# Patient Record
Sex: MALE | Race: WHITE | HISPANIC OR LATINO | URBAN - METROPOLITAN AREA
[De-identification: names, ages, dates, MRNs, and addresses within clinical notes are randomized per-mention and may not be internally consistent; named-entity substitution may affect disease eponyms.]

---

## 2022-05-03 ENCOUNTER — OFFICE (OUTPATIENT)
Dept: URBAN - METROPOLITAN AREA CLINIC 79 | Facility: CLINIC | Age: 67
End: 2022-05-03

## 2022-05-03 VITALS
WEIGHT: 231 LBS | DIASTOLIC BLOOD PRESSURE: 101 MMHG | HEART RATE: 85 BPM | HEIGHT: 72 IN | SYSTOLIC BLOOD PRESSURE: 147 MMHG

## 2022-05-03 DIAGNOSIS — K22.70 BARRETT'S ESOPHAGUS WITHOUT DYSPLASIA: ICD-10-CM

## 2022-05-03 DIAGNOSIS — R14.0 ABDOMINAL DISTENSION (GASEOUS): ICD-10-CM

## 2022-05-03 DIAGNOSIS — K43.9 VENTRAL HERNIA WITHOUT OBSTRUCTION OR GANGRENE: ICD-10-CM

## 2022-05-03 DIAGNOSIS — K21.9 GASTRO-ESOPHAGEAL REFLUX DISEASE WITHOUT ESOPHAGITIS: ICD-10-CM

## 2022-05-03 PROCEDURE — 99204 OFFICE O/P NEW MOD 45 MIN: CPT

## 2022-05-03 RX ORDER — PANTOPRAZOLE SODIUM 40 MG/1
TABLET, DELAYED RELEASE ORAL
Qty: 90 | Refills: 2 | Status: ACTIVE

## 2022-05-03 NOTE — SERVICEHPINOTES
68 y/o male with hx of HTN, HLD, DM, presents to office for evaluation of abdominal bloating. Reports this is an ongoing problem for some years. Believes it could be related to abdominal "bulge, maybe hernia". States he usually wakes up feeling fine but as soon as he eats, any kind of food, he is bloated immediately. Denies abdominal pain, nausea, vomiting. There is early satiety but only sometimes. He endorses severe reflux with certain foods, this is chronic, but severity seems to be increasing. If he eats too close to bedtime or has bread in the evenings he has to sleep "sitting up". He has pantoprazole at home but has only been taking as needed. Denies dysphagia, odynophagia. His bowel habits are regular, 1BM/day, type 4-5 BSS. No hematochezia or melena. Weight stable. He states diabetes is not well controlled lately, fasting BS in 250s. br --EGD 12/2013 showed intestinal metaplasia at GE junction.
br 
--Colonoscopy 12/2013, normal. R/c 10 yrs.br

## 2022-06-16 ENCOUNTER — AMBULATORY SURGICAL CENTER (OUTPATIENT)
Dept: URBAN - METROPOLITAN AREA SURGERY 23 | Facility: SURGERY | Age: 67
End: 2022-06-16
Payer: MEDICARE

## 2022-06-16 DIAGNOSIS — K29.60 OTHER GASTRITIS WITHOUT BLEEDING: ICD-10-CM

## 2022-06-16 DIAGNOSIS — K22.70 BARRETT'S ESOPHAGUS WITHOUT DYSPLASIA: ICD-10-CM

## 2022-06-16 DIAGNOSIS — K21.9 GASTRO-ESOPHAGEAL REFLUX DISEASE WITHOUT ESOPHAGITIS: ICD-10-CM

## 2022-06-16 PROCEDURE — 43239 EGD BIOPSY SINGLE/MULTIPLE: CPT | Performed by: INTERNAL MEDICINE

## 2022-08-09 ENCOUNTER — OFFICE (OUTPATIENT)
Dept: URBAN - METROPOLITAN AREA CLINIC 79 | Facility: CLINIC | Age: 67
End: 2022-08-09

## 2022-08-09 VITALS
TEMPERATURE: 97.9 F | SYSTOLIC BLOOD PRESSURE: 145 MMHG | HEIGHT: 72 IN | WEIGHT: 232 LBS | DIASTOLIC BLOOD PRESSURE: 79 MMHG | HEART RATE: 76 BPM

## 2022-08-09 DIAGNOSIS — R14.0 ABDOMINAL DISTENSION (GASEOUS): ICD-10-CM

## 2022-08-09 DIAGNOSIS — K22.70 BARRETT'S ESOPHAGUS WITHOUT DYSPLASIA: ICD-10-CM

## 2022-08-09 DIAGNOSIS — K31.89 OTHER DISEASES OF STOMACH AND DUODENUM: ICD-10-CM

## 2022-08-09 DIAGNOSIS — K21.9 GASTRO-ESOPHAGEAL REFLUX DISEASE WITHOUT ESOPHAGITIS: ICD-10-CM

## 2022-08-09 PROCEDURE — 99214 OFFICE O/P EST MOD 30 MIN: CPT

## 2022-08-09 NOTE — SERVICEHPINOTES
66 y/o male here to discuss EGD findings. 
br --6/16/22: abnormal mucosa in esophagus (Bx Pradhan's), mild non-erosive gastritis (Bx GIM, neg h.pylori), normal duodenum.
br
br Patient evaluated 5/3/22 for bloating which has been a problem for some years. Patient thought it could be r/t abdominal "bulge, maybe hernia" he was referred to surgery for eval and determined no hernia, only diastasis recti. He has been trying to lose weight as recommended but still feels bloated. There is no pain, nausea, vomiting.
br Pradhan's esophagus is not new but GIM is. No tobacco, no alcohol. He is not taking pantoprazole as prescribed--only as needed. States he has reflux only if he overeats. Feeling well otherwise.br

## 2025-02-17 ENCOUNTER — OFFICE (OUTPATIENT)
Dept: URBAN - METROPOLITAN AREA CLINIC 34 | Facility: CLINIC | Age: 70
End: 2025-02-17
Payer: MEDICARE

## 2025-02-17 VITALS
HEART RATE: 95 BPM | TEMPERATURE: 97.3 F | SYSTOLIC BLOOD PRESSURE: 122 MMHG | DIASTOLIC BLOOD PRESSURE: 73 MMHG | HEIGHT: 73 IN | WEIGHT: 216 LBS

## 2025-02-17 DIAGNOSIS — Z12.11 ENCOUNTER FOR SCREENING FOR MALIGNANT NEOPLASM OF COLON: ICD-10-CM

## 2025-02-17 DIAGNOSIS — R19.7 DIARRHEA, UNSPECIFIED: ICD-10-CM

## 2025-02-17 PROCEDURE — 99214 OFFICE O/P EST MOD 30 MIN: CPT

## 2025-02-17 NOTE — SERVICEHPINOTES
YUSUF KEN   is a   70   male with h/o T2DM, HTN, HLD who presents for OV prior to colonoscopy.  Last colonoscopy 02/2013--evidence of internal hemorrhoids, otherwise unremarkable.  10-year recall was advised.  Patient denies weight loss, early satiety, loss of appetite, abdominal pain, rectal bleeding, melena, dysphagia, n/v.  He eats healthy diet and is physically active.  BMs regular--at least once daily.  He notes occasional delay in passing stool during BM, however denies straining.  States he feels like "rectum takes long to open."  He does occasionally notice loose stools after evacuation, BSS 7 at least 1-2x/week.  He attributes loose stools to Mounjaro which he has been on x 1 year.  Admits complete emptying most times.  He denies recent antibiotic use.  Denies rectal bleeding, melena, weight loss, abdominal pain, early satiety, loss of appetite.  There is no family hx of colon cancer.  br
brDenies personal hx of CVD/heart conditions. Denies MI. Denies anticoagulant use. brDenies known kidney disease. Denies hx of stroke.brDenies sleep apnea, COPD, asthma. brDenies hx of adverse reactions to anesthesia.br